# Patient Record
Sex: FEMALE | Race: BLACK OR AFRICAN AMERICAN | NOT HISPANIC OR LATINO | Employment: FULL TIME | URBAN - METROPOLITAN AREA
[De-identification: names, ages, dates, MRNs, and addresses within clinical notes are randomized per-mention and may not be internally consistent; named-entity substitution may affect disease eponyms.]

---

## 2017-02-06 ENCOUNTER — ALLSCRIPTS OFFICE VISIT (OUTPATIENT)
Dept: OTHER | Facility: OTHER | Age: 52
End: 2017-02-06

## 2017-02-17 DIAGNOSIS — I10 ESSENTIAL (PRIMARY) HYPERTENSION: ICD-10-CM

## 2017-03-03 ENCOUNTER — OFFICE VISIT (OUTPATIENT)
Dept: URGENT CARE | Facility: MEDICAL CENTER | Age: 52
End: 2017-03-03
Payer: COMMERCIAL

## 2017-03-03 PROCEDURE — S9088 SERVICES PROVIDED IN URGENT: HCPCS

## 2017-03-03 PROCEDURE — 99214 OFFICE O/P EST MOD 30 MIN: CPT

## 2017-03-29 ENCOUNTER — TRANSCRIBE ORDERS (OUTPATIENT)
Dept: ADMINISTRATIVE | Facility: HOSPITAL | Age: 52
End: 2017-03-29

## 2017-03-29 DIAGNOSIS — Z12.31 OTHER SCREENING MAMMOGRAM: Primary | ICD-10-CM

## 2017-05-24 ENCOUNTER — OFFICE VISIT (OUTPATIENT)
Dept: URGENT CARE | Facility: MEDICAL CENTER | Age: 52
End: 2017-05-24
Payer: COMMERCIAL

## 2017-05-24 PROCEDURE — S9088 SERVICES PROVIDED IN URGENT: HCPCS

## 2017-05-24 PROCEDURE — 99214 OFFICE O/P EST MOD 30 MIN: CPT

## 2017-06-12 ENCOUNTER — HOSPITAL ENCOUNTER (OUTPATIENT)
Dept: MAMMOGRAPHY | Facility: MEDICAL CENTER | Age: 52
Discharge: HOME | End: 2017-06-12
Payer: COMMERCIAL

## 2017-06-12 DIAGNOSIS — Z12.31 OTHER SCREENING MAMMOGRAM: ICD-10-CM

## 2017-06-12 PROCEDURE — G0202 SCR MAMMO BI INCL CAD: HCPCS

## 2017-09-25 ENCOUNTER — APPOINTMENT (OUTPATIENT)
Dept: LAB | Facility: HOSPITAL | Age: 52
End: 2017-09-25
Payer: COMMERCIAL

## 2017-09-25 ENCOUNTER — ALLSCRIPTS OFFICE VISIT (OUTPATIENT)
Dept: OTHER | Facility: OTHER | Age: 52
End: 2017-09-25

## 2017-09-25 DIAGNOSIS — D57.3 SICKLE-CELL TRAIT (HCC): ICD-10-CM

## 2017-09-25 LAB
BASOPHILS # BLD AUTO: 0.03 THOUSANDS/ΜL (ref 0–0.1)
BASOPHILS NFR BLD AUTO: 1 % (ref 0–1)
EOSINOPHIL # BLD AUTO: 0.13 THOUSAND/ΜL (ref 0–0.61)
EOSINOPHIL NFR BLD AUTO: 2 % (ref 0–6)
ERYTHROCYTE [DISTWIDTH] IN BLOOD BY AUTOMATED COUNT: 15.5 % (ref 11.6–15.1)
HCT VFR BLD AUTO: 34 % (ref 34.8–46.1)
HGB BLD-MCNC: 10.6 G/DL (ref 11.5–15.4)
LYMPHOCYTES # BLD AUTO: 2.87 THOUSANDS/ΜL (ref 0.6–4.47)
LYMPHOCYTES NFR BLD AUTO: 46 % (ref 14–44)
MCH RBC QN AUTO: 22.5 PG (ref 26.8–34.3)
MCHC RBC AUTO-ENTMCNC: 31.2 G/DL (ref 31.4–37.4)
MCV RBC AUTO: 72 FL (ref 82–98)
MONOCYTES # BLD AUTO: 0.37 THOUSAND/ΜL (ref 0.17–1.22)
MONOCYTES NFR BLD AUTO: 6 % (ref 4–12)
NEUTROPHILS # BLD AUTO: 2.8 THOUSANDS/ΜL (ref 1.85–7.62)
NEUTS SEG NFR BLD AUTO: 45 % (ref 43–75)
NRBC BLD AUTO-RTO: 0 /100 WBCS
PLATELET # BLD AUTO: 287 THOUSANDS/UL (ref 149–390)
PMV BLD AUTO: 12.7 FL (ref 8.9–12.7)
RBC # BLD AUTO: 4.71 MILLION/UL (ref 3.81–5.12)
WBC # BLD AUTO: 6.21 THOUSAND/UL (ref 4.31–10.16)

## 2017-09-25 PROCEDURE — 85025 COMPLETE CBC W/AUTO DIFF WBC: CPT

## 2017-09-25 PROCEDURE — 36415 COLL VENOUS BLD VENIPUNCTURE: CPT

## 2017-09-26 ENCOUNTER — GENERIC CONVERSION - ENCOUNTER (OUTPATIENT)
Dept: OTHER | Facility: OTHER | Age: 52
End: 2017-09-26

## 2017-10-10 ENCOUNTER — OFFICE VISIT (OUTPATIENT)
Dept: URGENT CARE | Facility: MEDICAL CENTER | Age: 52
End: 2017-10-10
Payer: COMMERCIAL

## 2017-10-10 PROCEDURE — 99213 OFFICE O/P EST LOW 20 MIN: CPT

## 2017-10-10 PROCEDURE — S9088 SERVICES PROVIDED IN URGENT: HCPCS

## 2017-10-11 NOTE — PROGRESS NOTES
Assessment  1  Right otitis externa (380 10) (H60 91)    Plan  Right otitis externa    · Start: Neomycin-Polymyxin-HC 3 [id number] Otic Solution; INSTILL 4 DROPS IN LEFT  EAR 3-4 TIMES DAILY    Discussion/Summary  Discussion Summary:   I prescribed Cortisporin ear drops -4 drops to be applied it to right ear 4 times a day for 7 days  Strongly encouraged patient to take Tylenol or Motrin as needed for pain  Follow-up per primary care provider if right ear canal pain persists or worsens  She expressed understanding  Chief Complaint  1  Ear Pain  Chief Complaint Free Text Note Form: Patient relates started with right ear pain x3 weeks  Denies fever  Denies cough  Denies congestion  History of Present Illness  HPI: Patient here today with complaint of right ear pain for the last 3 weeks  Describes tenderness upon touching her right canal  Denies any blockage  Denies any purulent discharge or bleeding from the right ear  She is taking no pain medication  Denies previous history of right ear pain  Hospital Based Practices Required Assessment:   Abuse And Domestic Violence Screen    Yes, the patient is safe at home -The patient states no one is hurting them  Depression And Suicide Screen  No, the patient has not had thoughts of hurting themself  No, the patient has not felt depressed in the past 7 days  Prefered Language is  english  Primary Language is  english  Review of Systems  Focused-Female:   Constitutional: No fever, no chills, feels well, no tiredness, no recent weight gain or loss  ENT: earache  Cardiovascular: no complaints of slow or fast heart rate, no chest pain, no palpitations, no leg claudication or lower extremity edema  Respiratory: no complaints of shortness of breath, no wheezing, no dyspnea on exertion, no orthopnea or PND  Active Problems  1  Benign essential hypertension (401 1) (I10)  2  Dizziness (780 4) (R42)  3  Knee clicking (415 67) (D15 601)  4   PTSD (post-traumatic stress disorder) (309 81) (F43 10)  5  Right ear pain (388 70) (H92 01)  6  Sickle cell trait (282 5) (D57 3)  7  Vitamin D insufficiency (268 9) (E55 9)    Past Medical History  1  History of Breast cyst, left (610 0) (N60 02)  2  History of Eustachian tube dysfunction, right (381 81) (H69 81)  3  History of Ganglion of hand, left (727 41) (M67 442)  4  History of Ganglion of hand, unspecified laterality (727 43) (M67 449)  5  History of  6, not currently pregnant (V49 89) (Z78 9)  6  History of Left shoulder pain (719 41) (M25 512)  7  History of Premature menopause (256 31) (E28 319)  8  History of Strain of right trapezius muscle (840 8) (F75 307G)  Active Problems And Past Medical History Reviewed: The active problems and past medical history were reviewed and updated today  Family History  Mother   1  Family history of malignant neoplasm of urinary bladder (V16 52) (Z80 52)  2  Family history of Hypertension, benign  Father   3  Family history of Emphysema/COPD  4  Family history of Hypertension, benign  Sister   5  Family history of Hypertension, benign  Brother   6  Family history of multiple sclerosis (V17 2) (Z82 0)  Maternal Grandmother   7  Family history of Coronary heart disease  Paternal Aunt   6  Family history of Hypertension, benign    Social History   · Lives with daughter   · Lives with mother   · Never a smoker    Surgical History  1  History of Breast Surgery Lumpectomy  2  History of  Section  3  History of Hernia Repair Inguinal Unilateral    Current Meds  1  [employer] Vitamin D CAPS; Therapy: (Recorded:2017) to Recorded  2  Cyclobenzaprine HCl - 5 MG Oral Tablet; TAKE 1 TABLET AT BEDTIME AS NEEDED  May   take additional tablet at bedtime to equal 10 mg;   Therapy: 08SZX3807 to (Evaluate:2017)  Requested for: 74FMC5141; Last   Rx:2017 Ordered  3   Fluticasone Propionate 50 MCG/ACT Nasal Suspension; USE 1 TO 2 SPRAYS IN EACH   NOSTRIL ONCE DAILY; Therapy: 89FSB1380 to (Last MARICHUY:89GRT5364)  Requested for: 97TAH6592 Ordered  4  HydroCHLOROthiazide 25 MG Oral Tablet; TAKE 1 TABLET DAILY  Requested for:   09SLK1833; Last Rx:02Jun2017 Ordered  5  Meclizine HCl - 25 MG Oral Tablet; TAKE 1 TABLET 3 TIMES DAILY AS NEEDED; Therapy: 04EAF1520 to (Evaluate:58Fby0420)  Requested for: 48NML4999; Last   Rx:09Lnu2514 Ordered  6  Potassium Chloride ER 20 MEQ Oral Tablet Extended Release; Therapy: (Recorded:03Mar2017) to Recorded  Medication List Reviewed: The medication list was reviewed and updated today  Allergies  1  fentanyl  2  Bee sting    Vitals  Signs   Recorded: 46NPX6280 11:14AM   Temperature: 97 6 F, Tympanic  Heart Rate: 77  Respiration: 18  Systolic: 193, RUE, Sitting  Diastolic: 98, RUE, Sitting  Height: 5 ft 6 in  Weight: 183 lb 8 oz  BMI Calculated: 29 62  BSA Calculated: 1 93  O2 Saturation: 98, RA  Pain Scale: 0    Physical Exam    Constitutional   General appearance: No acute distress, well appearing and well nourished  Ears, Nose, Mouth, and Throat   External inspection of ears and nose: Normal     Otoscopic examination: Abnormal  -Right ear canal reveals a small papule measuring about 1-2 mm in size tender to touch  Consistent with a small pustule most likely triggering symptoms of otitis externa  Tympanic membrane is intact  Nasal mucosa, septum, and turbinates: Normal without edema or erythema  Oropharynx: Normal with no erythema, edema, exudate or lesions  Pulmonary   Respiratory effort: No increased work of breathing or signs of respiratory distress  Auscultation of lungs: Clear to auscultation  Cardiovascular   Palpation of heart: Normal PMI, no thrills  Future Appointments    Date/Time Provider Specialty Site   03/14/2018 03:45 PM CEDRICK Winter   [facility]     Signatures   Electronically signed by : CEDRICK Cyr ; Oct 10 2017 11:37AM EST (Author)    Electronically signed by : CEDRICK Taylro ; Oct 10 2017 12:19PM EST                       (Author)

## 2017-10-17 ENCOUNTER — GENERIC CONVERSION - ENCOUNTER (OUTPATIENT)
Dept: OTHER | Facility: OTHER | Age: 52
End: 2017-10-17

## 2018-01-13 NOTE — PROGRESS NOTES
Assessment    1  History of Acute serous otitis media (381 01) (H65 00)   2  History of Eustachian tube dysfunction, right (381 81) (H69 81)   3  History of Muscle strain of chest wall (848 8) (S29 011A)   4  History of Strain of right trapezius muscle (840 8) (S46 811A)   5  Benign essential hypertension (401 1) (I10)   6  Encounter for preventive health examination (V70 0) (Z00 00)   7  Sickle cell trait (282 5) (D57 3)   8  Knee clicking (608 65) (R66 433)    Plan  Benign essential hypertension    · Begin or continue regular aerobic exercise  Gradually work up to at least 3 sessions of 30  minutes of exercise a week ; Status:Complete;   Done: 46UAC7699 04:43PM  Health Maintenance    · Follow-up visit in 1 year Evaluation and Treatment  Follow-up  Status: Hold For -  Scheduling  Requested for: 64HRW1662   · Drink plenty of fluids ; Status:Complete;   Done: 32OZS3368 04:22PM   · We recommend routine visits to a dentist ; Status:Complete;   Done: 66BFG5077  04:22PM   · We recommend that you follow the "Mediterranean diet "; Status:Complete;   Done:  82SSZ4028 04:22PM    NSAIDs, ROM/stretches prn, compresses  Reviewed general knee care  Watch Na+, regular exercise, medication as Rx'd  Check CBC     History of Present Illness  HM, Adult Female: The patient is being seen for a health maintenance evaluation  The last health maintenance visit was 6 month(s) ago  General Health: The patient's health since the last visit is described as good  She has regular dental visits  She complains of vision problems  Vision care includes no recent eye examination  The patient complains of difficulty reading  Lifestyle:  She does not have any weight concerns  She does not use tobacco    Screening: Breast cancer screening includes a mammogram performed 6/17  She hasn't been previously screened for colorectal cancer  HPI: 48 y/o BF for f/u appt  , health maintenance  NO acute c/o   She does reports bilateral knee clicking but other wise doing well  Review of Systems    Constitutional: No fever, no chills, feels well, no tiredness, no recent weight gain or weight loss  Eyes: No complaints of eye pain, no red eyes, no eyesight problems, no discharge, no dry eyes, no itching of eyes  ENT: no complaints of earache, no loss of hearing, no nose bleeds, no nasal discharge, no sore throat, no hoarseness  Cardiovascular: No complaints of slow heart rate, no fast heart rate, no chest pain, no palpitations, no leg claudication, no lower extremity edema  Respiratory: No complaints of shortness of breath, no wheezing, no cough, no SOB on exertion, no orthopnea, no PND  Gastrointestinal: No complaints of abdominal pain, no constipation, no nausea or vomiting, no diarrhea, no bloody stools  Genitourinary: No complaints of dysuria, no incontinence, no pelvic pain, no dysmenorrhea, no vaginal discharge or bleeding  Musculoskeletal: myalgias, but as noted in HPI, no arthralgias, no joint swelling and no joint stiffness  Integumentary: No complaints of skin rash or lesions, no itching, no skin wounds, no breast pain or lump  Neurological: No complaints of headache, no confusion, no convulsions, no numbness, no dizziness or fainting, no tingling, no limb weakness, no difficulty walking  Psychiatric: Not suicidal, no sleep disturbance, no anxiety or depression, no change in personality, no emotional problems  Endocrine: No complaints of proptosis, no hot flashes, no muscle weakness, no deepening of the voice, no feelings of weakness  Hematologic/Lymphatic: No complaints of swollen glands, no swollen glands in the neck, does not bleed easily, does not bruise easily  ROS reviewed  Active Problems    1  Benign essential hypertension (401 1) (I10)   2  Dizziness (780 4) (R42)   3  PTSD (post-traumatic stress disorder) (309 81) (F43 10)   4  Sickle cell trait (282 5) (D57 3)   5   Vitamin D insufficiency (268 9) (E55 9)    Past Medical History    · History of Breast cyst, left (610 0) (N60 02)   · History of Eustachian tube dysfunction, right (381 81) (H69 81)   · History of Ganglion of hand, left (727 41) (M67 442)   · History of Ganglion of hand, unspecified laterality (727 43) (M67 449)   · History of  6, not currently pregnant (V49 89) (Z78 9)   · History of Left shoulder pain (719 41) (M25 512)   · History of Premature menopause (256 31) (E28 319)   · History of Strain of right trapezius muscle (840 8) (A94 205D)    Surgical History    · History of Breast Surgery Lumpectomy   · History of  Section   · History of Hernia Repair Inguinal Unilateral    Family History  Mother    · Family history of malignant neoplasm of urinary bladder (V16 52) (Z80 52)   · Family history of Hypertension, benign  Father    · Family history of Emphysema/COPD   · Family history of Hypertension, benign  Sister    · Family history of Hypertension, benign  Brother    · Family history of multiple sclerosis (V17 2) (Z82 0)  Maternal Grandmother    · Family history of Coronary heart disease  Paternal Aunt    · Family history of Hypertension, benign    Social History    · Lives with daughter   · Autism jayy   · Lives with mother   · Never a smoker    Current Meds   1  [employer]S Vitamin D CAPS; Therapy: (Recorded:2017) to Recorded   2  Cyclobenzaprine HCl - 5 MG Oral Tablet; TAKE 1 TABLET AT BEDTIME AS NEEDED  May take additional tablet at bedtime to equal 10 mg;   Therapy: 58VTA7227 to (Evaluate:2017)  Requested for: 12BBF0987; Last   Rx:2017 Ordered   3  Fluticasone Propionate 50 MCG/ACT Nasal Suspension; USE 1 TO 2 SPRAYS IN EACH   NOSTRIL ONCE DAILY; Therapy: 87RTG7079 to (Last RK:29XAS6147)  Requested for: 11DDZ4655 Ordered   4  HydroCHLOROthiazide 25 MG Oral Tablet; TAKE 1 TABLET DAILY  Requested for:   65ZLC7553; Last Rx:2017 Ordered   5   Meclizine HCl - 25 MG Oral Tablet; TAKE 1 TABLET 3 TIMES DAILY AS NEEDED; Therapy: 86UMQ8631 to (Evaluate:31May2017)  Requested for: 67JGU2989; Last   Rx:24May2017 Ordered   6  Potassium Chloride ER 20 MEQ Oral Tablet Extended Release; Therapy: (Recorded:03Mar2017) to Recorded    Allergies    1  fentanyl    2  Bee sting    Vitals   Recorded: 83UZK4341 80:93DH   Systolic 627, RUE, Sitting   Diastolic 82, RUE, Sitting   BP CUFF SIZE Large   Height 5 ft 6 in   Weight 186 lb    BMI Calculated 30 02   BSA Calculated 1 94     Physical Exam    Constitutional   General appearance: No acute distress, well appearing and well nourished  Eyes   Conjunctiva and lids: No swelling, erythema or discharge  Pupils and irises: Equal, round and reactive to light  Ears, Nose, Mouth, and Throat   External inspection of ears and nose: Normal     Oropharynx: Normal with no erythema, edema, exudate or lesions  Pulmonary   Respiratory effort: No increased work of breathing or signs of respiratory distress  Auscultation of lungs: Clear to auscultation  Cardiovascular   Auscultation of heart: Normal rate and rhythm, normal S1 and S2, without murmurs  Examination of extremities for edema and/or varicosities: Normal     Abdomen   Abdomen: Non-tender, no masses  The abdomen was flat  Lymphatic   Palpation of lymph nodes in neck: No lymphadenopathy  Musculoskeletal   Gait and station: Normal     Digits and nails: Normal without clubbing or cyanosis  Inspection/palpation of joints, bones, and muscles: Normal     Skin   Skin and subcutaneous tissue: Normal without rashes or lesions  Neurologic   Cranial nerves: Cranial nerves 2-12 intact  Reflexes: 2+ and symmetric  Sensation: No sensory loss      Psychiatric   Orientation to person, place, and time: Normal     Mood and affect: Normal        Results/Data  PHQ-2 Adult Depression Screening 46Tey3192 04:05PM User, s     Test Name Result Flag Reference   PHQ-2 Adult Depression Score 0     Over the last two weeks, how often have you been bothered by any of the following problems? Little interest or pleasure in doing things: Not at all - 0  Feeling down, depressed, or hopeless: Not at all - 0   PHQ-2 Adult Depression Screening Negative         Future Appointments    Date/Time Provider Specialty Site   03/14/2018 03:45 PM CEDRICK Lee   [facility]     Signatures   Electronically signed by : CEDRICK Florence ; Sep 25 2017  4:45PM EST                       (Author)

## 2018-01-14 VITALS
WEIGHT: 192 LBS | HEIGHT: 66 IN | DIASTOLIC BLOOD PRESSURE: 92 MMHG | SYSTOLIC BLOOD PRESSURE: 140 MMHG | BODY MASS INDEX: 30.86 KG/M2

## 2018-01-14 VITALS
WEIGHT: 186 LBS | BODY MASS INDEX: 29.89 KG/M2 | DIASTOLIC BLOOD PRESSURE: 82 MMHG | SYSTOLIC BLOOD PRESSURE: 132 MMHG | HEIGHT: 66 IN

## 2018-01-16 NOTE — RESULT NOTES
Verified Results  (1) CBC/PLT/DIFF 89WFK6764 08:45PM Selene Garland Order Number: [id number]     Test Name Result Flag Reference   WBC COUNT 6 21 Thousand/uL  4 31-10 16   RBC COUNT 4 71 Million/uL  3 81-5 12   HEMOGLOBIN 10 6 g/dL L 11 5-15 4   HEMATOCRIT 34 0 % L 34 8-46  1   MCV 72 fL L 82-98   MCH 22 5 pg L 26 8-34 3   MCHC 31 2 g/dL L 31 4-37 4   RDW 15 5 % H 11 6-15 1   MPV 12 7 fL  8 9-12 7   PLATELET COUNT 415 Thousands/uL  149-390   nRBC AUTOMATED 0 /100 WBCs     NEUTROPHILS RELATIVE PERCENT 45 %  43-75   LYMPHOCYTES RELATIVE PERCENT 46 % H 14-44   MONOCYTES RELATIVE PERCENT 6 %  4-12   EOSINOPHILS RELATIVE PERCENT 2 %  0-6   BASOPHILS RELATIVE PERCENT 1 %  0-1   NEUTROPHILS ABSOLUTE COUNT 2 80 Thousands/? ??L  1 85-7 62   LYMPHOCYTES ABSOLUTE COUNT 2 87 Thousands/? ??L  0 60-4 47   MONOCYTES ABSOLUTE COUNT 0 37 Thousand/? ??L  0 17-1 22   EOSINOPHILS ABSOLUTE COUNT 0 13 Thousand/? ??L  0 00-0 61   BASOPHILS ABSOLUTE COUNT 0 03 Thousands/? ??L  0 00-0 10

## 2018-01-22 VITALS — DIASTOLIC BLOOD PRESSURE: 86 MMHG | SYSTOLIC BLOOD PRESSURE: 128 MMHG

## 2018-01-25 DIAGNOSIS — I10 ESSENTIAL HYPERTENSION: Primary | ICD-10-CM

## 2018-01-25 RX ORDER — HYDROCHLOROTHIAZIDE 25 MG/1
1 TABLET ORAL DAILY
COMMUNITY
End: 2018-01-25 | Stop reason: SDUPTHER

## 2018-01-26 RX ORDER — HYDROCHLOROTHIAZIDE 25 MG/1
25 TABLET ORAL DAILY
Qty: 30 TABLET | Refills: 1 | Status: SHIPPED | OUTPATIENT
Start: 2018-01-26 | End: 2018-03-26 | Stop reason: SDUPTHER

## 2018-03-13 PROBLEM — D57.3 SICKLE CELL TRAIT (HCC): Status: ACTIVE | Noted: 2017-02-06

## 2018-03-13 PROBLEM — I10 BENIGN ESSENTIAL HYPERTENSION: Status: ACTIVE | Noted: 2017-02-06

## 2018-03-13 PROBLEM — F43.10 PTSD (POST-TRAUMATIC STRESS DISORDER): Status: ACTIVE | Noted: 2017-02-06

## 2018-03-13 PROBLEM — E55.9 VITAMIN D INSUFFICIENCY: Status: ACTIVE | Noted: 2017-02-06

## 2018-03-26 DIAGNOSIS — I10 ESSENTIAL HYPERTENSION: ICD-10-CM

## 2018-03-26 RX ORDER — HYDROCHLOROTHIAZIDE 25 MG/1
25 TABLET ORAL DAILY
Qty: 30 TABLET | Refills: 2 | Status: SHIPPED | OUTPATIENT
Start: 2018-03-26

## 2018-03-26 NOTE — TELEPHONE ENCOUNTER
I spoke with Woody Pierce at [facility], requesting refill of HCTZ for patient  Submitted to [facility]   For approval  Zoya Baldwin

## 2022-06-24 ENCOUNTER — APPOINTMENT (OUTPATIENT)
Dept: RADIOLOGY | Facility: MEDICAL CENTER | Age: 57
End: 2022-06-24

## 2022-06-24 ENCOUNTER — OCCMED (OUTPATIENT)
Dept: URGENT CARE | Facility: MEDICAL CENTER | Age: 57
End: 2022-06-24

## 2022-06-24 DIAGNOSIS — Z13.9 SCREENING FOR CONDITION: ICD-10-CM

## 2022-06-24 DIAGNOSIS — Z13.9 SCREENING FOR CONDITION: Primary | ICD-10-CM

## 2022-06-24 PROCEDURE — 71045 X-RAY EXAM CHEST 1 VIEW: CPT

## 2023-05-30 ENCOUNTER — APPOINTMENT (OUTPATIENT)
Dept: URGENT CARE | Facility: CLINIC | Age: 58
End: 2023-05-30

## 2023-06-06 ENCOUNTER — OCCMED (OUTPATIENT)
Dept: URGENT CARE | Facility: CLINIC | Age: 58
End: 2023-06-06
Payer: OTHER MISCELLANEOUS

## 2023-06-06 DIAGNOSIS — S39.012D BACK STRAIN, SUBSEQUENT ENCOUNTER: Primary | ICD-10-CM

## 2023-06-06 PROCEDURE — 99213 OFFICE O/P EST LOW 20 MIN: CPT | Performed by: PHYSICIAN ASSISTANT

## 2023-06-08 ENCOUNTER — EVALUATION (OUTPATIENT)
Dept: PHYSICAL THERAPY | Facility: CLINIC | Age: 58
End: 2023-06-08
Payer: OTHER MISCELLANEOUS

## 2023-06-08 DIAGNOSIS — S39.012A STRAIN OF MUSCLE, FASCIA AND TENDON OF LOWER BACK, INITIAL ENCOUNTER: Primary | ICD-10-CM

## 2023-06-08 PROCEDURE — 97161 PT EVAL LOW COMPLEX 20 MIN: CPT | Performed by: PHYSICAL THERAPIST

## 2023-06-08 PROCEDURE — 97140 MANUAL THERAPY 1/> REGIONS: CPT | Performed by: PHYSICAL THERAPIST

## 2023-06-08 NOTE — PROGRESS NOTES
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  Patient would benefit from skilled PT to address listed impairments and increase functional status  Impairments: abnormal muscle firing, abnormal or restricted ROM, activity intolerance, impaired physical strength, lacks appropriate home exercise program, pain with function and poor body mechanics    Symptom irritability: low  Goals  Short term goals (2-3 weeks):  1  Patient will demonstrate understanding and compliance of HEP  2  Patient will strengthen hip abductors to 3+/5   3  Patient will report working a full shift with minimal pain  Long term goals (4-6 weeks):  1  Patient will demonstrate proper lifting mechanics to perform occupational tasks  2  Patient will increase hip abductor strength to 4-/5    3  Patient will meet or exceed expected FOTO outcome       Plan  Patient would benefit from: skilled physical therapy  Planned modality interventions: cryotherapy, low level laser therapy, TENS, thermotherapy: hydrocollator packs, traction and unattended electrical stimulation  Planned therapy interventions: abdominal trunk stabilization, activity modification, body mechanics training, functional ROM exercises, flexibility, home exercise program, therapeutic exercise, therapeutic activities, stretching, strengthening, neuromuscular re-education, joint "mobilization, manual therapy, balance, patient education and transfer training  Frequency: 2x week  Duration in weeks: 8  Treatment plan discussed with: patient        Subjective Evaluation    History of Present Illness  Mechanism of injury: Patient presents to outpatient physical therapy with chief complaint of low back pain  Patient reports sudden pain that began end of May when she was helping lift a patient at work  Similar injury has happened prior, which was resolved with muscle relaxants  Patient Denies episodes of numbness or tingling in LE  Pain described as aching in low back  Pain rating currently 3/10, at best 2/10 and at worst 9/10  Patient states limitations with working, bending forwards, sleeping  Pt is a caregiver at [employer]  She is currently on light duty at work, per doctor's orders with a 10 pound lifting restriction  Aggravating factors include bending forward  Patient states use of steroid medication, muscle relaxants, tyenol, and heat for relief of symptoms  Pt goals for therapy include decrease in pain and to \"get well\"  Recurrent probem    Quality of life: good    Pain  Current pain rating: 3  At best pain ratin  At worst pain ratin  Quality: dull ache  Relieving factors: heat, medications and rest  Aggravating factors: lifting  Progression: improved    Social Support    Employment status: working  Hand dominance: right      Diagnostic Tests    FCE comments: XRAY lumbar 23: IMPRESSION:   No lumbar spine fracture or subluxation     Treatments  Previous treatment: medication  Current treatment: medication  Patient Goals  Patient goals for therapy: decreased pain, increased strength, return to work and independence with ADLs/IADLs          Objective     Concurrent Complaints  Negative for bladder dysfunction, bowel dysfunction and saddle (S4) numbness    Palpation   Left   Tenderness of the gluteus florina, gluteus medius and " piriformis  Right   Hypertonic in the gluteus florina and gluteus medius  Tenderness of the gluteus florina, gluteus medius and piriformis  Neurological Testing     Sensation     Lumbar   Left   Intact: light touch    Right   Intact: light touch    Reflexes   Left   Patellar (L4): normal (2+)  Achilles (S1): normal (2+)    Right   Patellar (L4): normal (2+)  Achilles (S1): normal (2+)    Active Range of Motion     Lumbar   Flexion: Active lumbar flexion: Painful  Restriction level: minimal  Extension:  Restriction level: minimal  Left lateral flexion: Active left lumbar lateral flexion: Painful  Restriction level: minimal  Right lateral flexion:  Restriction level: minimal    Additional Active Range of Motion Details  Left lumbar flexion was painful and pt reported feeling a stretch from the right low back  Joint Play   Joints within functional limits: L2, L3, L4, L5 and S1   Mechanical Assessment    Cervical      Thoracic      Lumbar    Standing extension: repeated movements  Pain intensity: better  Pain level: decreased    Strength/Myotome Testing     Left Hip   Planes of Motion   Flexion: 4  Abduction: 3    Right Hip   Planes of Motion   Flexion: 4  Abduction: 3    Left Knee   Flexion: 4+  Extension: 4+    Right Knee   Flexion: 4+  Extension: 4+    Left Ankle/Foot   Dorsiflexion: 4+  Plantar flexion: 4+    Right Ankle/Foot   Dorsiflexion: 4+  Plantar flexion: 4+    Tests     Lumbar     Left   Positive femoral stretch, passive SLR and slump test      Right   Positive femoral stretch, passive SLR and slump test      Left Hip   Positive Ely's  Negative GIANCARLO and FADIR  Right Hip   Positive Ely's  Negative GIANCARLO and FADIR  Additional Tests Details  Piriformis and quadriceps tightness noted bilaterally         Flowsheet Rows    Flowsheet Row Most Recent Value   PT/OT G-Codes    Current Score 52   Projected Score 66             Precautions: HTN      Manuals 6/8            Right glute med STM AG                                                   Neuro Re-Ed             TA activation              Pball press              Palloff press             TA SLR                                                     Ther Ex             Clamshells HEP            Piriformis stretch HEP            Standing lumbar extension  HEP            Seated Sciatic nerve glides  HEP            Standing hip abduction              Seated HS stretch             Prone quad stretch                          Ther Activity             Step ups              Mini squats              Lifting mechanics              Gait Training                                       Modalities

## 2023-06-14 ENCOUNTER — OFFICE VISIT (OUTPATIENT)
Dept: PHYSICAL THERAPY | Facility: CLINIC | Age: 58
End: 2023-06-14
Payer: OTHER MISCELLANEOUS

## 2023-06-14 DIAGNOSIS — S39.012A STRAIN OF MUSCLE, FASCIA AND TENDON OF LOWER BACK, INITIAL ENCOUNTER: Primary | ICD-10-CM

## 2023-06-14 PROCEDURE — 97140 MANUAL THERAPY 1/> REGIONS: CPT | Performed by: PHYSICAL THERAPIST

## 2023-06-14 PROCEDURE — 97110 THERAPEUTIC EXERCISES: CPT | Performed by: PHYSICAL THERAPIST

## 2023-06-14 NOTE — PROGRESS NOTES
"Daily Note     Today's date: 2023  Patient name: Arash Lizarraga  : 1965  MRN: [id number]  Referring provider: Devika Garza PA-C  Dx:   Encounter Diagnosis     ICD-10-CM    1  Strain of muscle, fascia and tendon of lower back, initial encounter  S39 012A                      Subjective: Pt reports feeling better from initial evaluation  Pt reports going to [place] over the weekend and sitting in the car for the long drive aggravated her back  Pt reports being constipated for 3 days then once she went to the bathroom her back was aggravated again  Today, she feels better  States she did not perform her HEP  Objective: See treatment diary below      Assessment: Pt experienced L hamstring muscle cramp after the recumbent bike; relieved with seated hamstring stretch  Slump test was re-assessed; sciatic nerve tension - bilaterally  Pt experienced hamstring cramps while performing clamshells resulting in decreased reps and discontinue of intervention during session  Pt reported a sharp pain after Pball roll outs; relieved with repeated standing lumbar extension  Pt education was provided on the importance of adhering to HEP and HEP was reviewed  Plan: Continue per plan of care  Precautions: HTN      Manuals            Right glute med STM AG RM the stick                                                   Neuro Re-Ed             TA activation   NV           Pball press              Palloff press             TA SLR                                                     Ther Ex             Bike  5 min Treadmill           Clamshells HEP x5           Piriformis stretch HEP 3x30\" ea           Standing lumbar extension  HEP x20           Seated Sciatic nerve glides  HEP            Standing hip abduction   2x10 ea  Seated HS stretch  3x30\" ea              Prone quad stretch             Pball roll outs  10x10\" 3 way           Ther Activity             Step ups              Mini squats            " Lifting mechanics              Gait Training                                       Modalities

## 2023-06-15 ENCOUNTER — OFFICE VISIT (OUTPATIENT)
Dept: PHYSICAL THERAPY | Facility: CLINIC | Age: 58
End: 2023-06-15
Payer: OTHER MISCELLANEOUS

## 2023-06-15 DIAGNOSIS — S39.012A STRAIN OF MUSCLE, FASCIA AND TENDON OF LOWER BACK, INITIAL ENCOUNTER: Primary | ICD-10-CM

## 2023-06-15 PROCEDURE — 97140 MANUAL THERAPY 1/> REGIONS: CPT | Performed by: PHYSICAL THERAPIST

## 2023-06-15 PROCEDURE — 97110 THERAPEUTIC EXERCISES: CPT | Performed by: PHYSICAL THERAPIST

## 2023-06-19 ENCOUNTER — OFFICE VISIT (OUTPATIENT)
Dept: PHYSICAL THERAPY | Facility: CLINIC | Age: 58
End: 2023-06-19
Payer: OTHER MISCELLANEOUS

## 2023-06-19 DIAGNOSIS — S39.012A STRAIN OF MUSCLE, FASCIA AND TENDON OF LOWER BACK, INITIAL ENCOUNTER: Primary | ICD-10-CM

## 2023-06-19 PROCEDURE — 97112 NEUROMUSCULAR REEDUCATION: CPT | Performed by: PHYSICAL THERAPIST

## 2023-06-19 PROCEDURE — 97140 MANUAL THERAPY 1/> REGIONS: CPT | Performed by: PHYSICAL THERAPIST

## 2023-06-19 PROCEDURE — 97110 THERAPEUTIC EXERCISES: CPT | Performed by: PHYSICAL THERAPIST

## 2023-06-19 NOTE — PROGRESS NOTES
"Daily Note     Today's date: 2023  Patient name: Dorothy Harrison  : 1965  MRN: [id number]  Referring provider: Ford Ravi PA-C  Dx:   Encounter Diagnosis     ICD-10-CM    1  Strain of muscle, fascia and tendon of lower back, initial encounter  S39 012A                      Subjective: Pt reports feeling very sore today because she stood a lot at work today  She is still on light duty at work  Although, she reports feeling good after last session  Objective: See treatment diary below      Assessment: Pt was verbally cued to maintain knee extension while performing SLR; pt demonstrated appropriate fatigue as reps progressed  Pt was verbally cued on how to perform TA activation and demonstrated good carryover  Pt was cued to decrease hip flexor compensation when performing standing hip abduction  Pt was cued to decrease trunk forward flexion when performing pball and palloff press to decrease compensation of TA weakness  Program was minimally progressed due to increased soreness caused from standing at work  Plan: Continue per plan of care  Precautions: HTN      Manuals 6/8 6/14 6/15 6/19         Right glute med STM AG RM the stick  RM the stick  RM the stick R erector spinae                                                Neuro Re-Ed             TA activation   NV 10x10\" 10x10\"         Pball press     2x10 3\" hold         Palloff press   OTB 3\"x10 OTB 3\"x10         TA SLR    x10 bilat 2x10 bilat                                                Ther Ex             Bike  5 min Treadmill 5min 1  4mph Treadmilll 0 5 mph 5 min          Clamshells HEP x5 2x10 bilat 2x10 bilat         Piriformis stretch HEP 3x30\" ea 3x30\" ea 3x30\" ea         Standing lumbar extension  HEP x20           Seated Sciatic nerve glides  HEP            Standing hip abduction   2x10 ea  2x10 ea  2x10 ea  Seated HS stretch  3x30\" ea  3x30\" ea  3x30\" ea            Prone quad stretch             Pball roll outs  " "10x10\" 3 way 10x10\" 3 way 10x10\" 3 way         Ther Activity             Step ups              Mini squats    x10 2x10         Lifting mechanics              Gait Training                                       Modalities             MH Lumbar    Supine 10 mins                           "

## 2023-06-20 ENCOUNTER — OCCMED (OUTPATIENT)
Dept: URGENT CARE | Facility: CLINIC | Age: 58
End: 2023-06-20
Payer: OTHER MISCELLANEOUS

## 2023-06-20 DIAGNOSIS — S39.012D BACK STRAIN, SUBSEQUENT ENCOUNTER: Primary | ICD-10-CM

## 2023-06-20 PROCEDURE — 99213 OFFICE O/P EST LOW 20 MIN: CPT | Performed by: FAMILY MEDICINE

## 2023-06-21 ENCOUNTER — OFFICE VISIT (OUTPATIENT)
Dept: PHYSICAL THERAPY | Facility: CLINIC | Age: 58
End: 2023-06-21
Payer: OTHER MISCELLANEOUS

## 2023-06-21 DIAGNOSIS — S39.012A STRAIN OF MUSCLE, FASCIA AND TENDON OF LOWER BACK, INITIAL ENCOUNTER: Primary | ICD-10-CM

## 2023-06-21 PROCEDURE — 97140 MANUAL THERAPY 1/> REGIONS: CPT

## 2023-06-21 PROCEDURE — 97110 THERAPEUTIC EXERCISES: CPT

## 2023-06-21 NOTE — PROGRESS NOTES
"Daily Note     Today's date: 2023  Patient name: Kranthi Patiño  : 1965  MRN: [id number]  Referring provider: Loreta Escamilla PA-C  Dx:   Encounter Diagnosis     ICD-10-CM    1  Strain of muscle, fascia and tendon of lower back, initial encounter  S39 012A                      Subjective: Patient reported feeling very fatigued today  Still on light duty at work  [facility] visit yesterday went well and will see them again in two weeks and potentially be released full duty at that time  Objective: See treatment diary below  Assessment: Continued to provide patient with verbal cueing for improved carryover of form with stretching and tactile cues to ensure proper TA activation with some decreased inhibition related to weakness  Skilled PT remains appropriate to further improve mobility and proximal strength needed to protect lumbar spine with activity and to introduce lifting mechanics to safely return patient to full duties  Plan: Continue per plan of care  Precautions: HTN    Manuals 6/8 6/14 6/15 6/19 6/21        Right glute med STM AG RM the stick  RM the stick  RM the stick R erector spinae R erector spinae and glute med with stick - EH                                               Neuro Re-Ed             TA activation   NV 10x10\" 10x10\" 10\"x  10        Pball press     2x10 3\" hold 3\" hold,  2x10        Palloff press   OTB 3\"x10 OTB 3\"x10 NV        TA SLR    x10 bilat 2x10 bilat 2x10  bilat                                               Ther Ex             Bike  5 min Treadmill 5min 1  4mph Treadmilll 0 5 mph 5 min  Treadmill  0 5 mph  5 min        Clamshells HEP x5 2x10 bilat 2x10 bilat 2x10  bilat        Piriformis stretch HEP 3x30\" ea 3x30\" ea 3x30\" ea 30\"x3  bilat        Standing lumbar extension  HEP x20           Seated Sciatic nerve glides  HEP            Standing hip abduction   2x10 ea  2x10 ea  2x10 ea  2x10  bilat        Seated HS stretch  3x30\" ea  3x30\" ea  3x30\" ea    " "30\"x3  bilat        Prone quad stretch             Pball roll outs  10x10\" 3 way 10x10\" 3 way 10x10\" 3 way 3-way  10\"x  10 ea        Ther Activity             Step ups              Mini squats    x10 2x10 2x10        Lifting mechanics              Gait Training                                       Modalities             MH Lumbar    Supine 10 mins def                          "

## 2023-06-26 ENCOUNTER — OFFICE VISIT (OUTPATIENT)
Dept: PHYSICAL THERAPY | Facility: CLINIC | Age: 58
End: 2023-06-26
Payer: OTHER MISCELLANEOUS

## 2023-06-26 DIAGNOSIS — S39.012A STRAIN OF MUSCLE, FASCIA AND TENDON OF LOWER BACK, INITIAL ENCOUNTER: Primary | ICD-10-CM

## 2023-06-26 PROCEDURE — 97140 MANUAL THERAPY 1/> REGIONS: CPT | Performed by: PHYSICAL THERAPIST

## 2023-06-26 PROCEDURE — 97110 THERAPEUTIC EXERCISES: CPT | Performed by: PHYSICAL THERAPIST

## 2023-06-26 NOTE — PROGRESS NOTES
"Daily Note     Today's date: 2023  Patient name: Bret Baptiste  : 1965  MRN: [id number]  Referring provider: Hugo Isaacs PA-C  Dx:   Encounter Diagnosis     ICD-10-CM    1  Strain of muscle, fascia and tendon of lower back, initial encounter  S39 012A                      Subjective: Pt reports feeling much better, but still has some low back pain from standing at work all day  Objective: See treatment diary below      Assessment: Intensity of clamshells was progressed by increasing number of reps  Pt demonstrated appropriate fatigue with increased intensity with no pain or muscular cramps this session  During the eccentric phase, pt was cued to perform clamshells and SLR in a slow, controlled manner; pt demonstrated carryover  Pt was cued to decrease excessive forward flexion when performing pball presses to decrease stress on back  Overall, pt is progressing well, further supported by improved FOTO outcomes  Plan: Continue per plan of care  Precautions: HTN    Manuals 6/8 6/14 6/15 6/19 6/21 6/26       Right glute med STM AG RM the stick  RM the stick  RM the stick R erector spinae R erector spinae and glute med with stick - EH R erector spinae - RM                                              Neuro Re-Ed             TA activation   NV 10x10\" 10x10\" 10\"x  10        Pball press     2x10 3\" hold 3\" hold,  2x10 3\" hold,  2x10       Palloff press   OTB 3\"x10 OTB 3\"x10 NV OTB 3\"x10       TA SLR    x10 bilat 2x10 bilat 2x10  bilat 2x10 bilat                                               Ther Ex             Bike  5 min Treadmill 5min 1  4mph Treadmilll 0 5 mph 5 min  Treadmill  0 5 mph  5 min Treadmill 0 8 mph        Clamshells HEP x5 2x10 bilat 2x10 bilat 2x10  bilat 3x10 bilat        Piriformis stretch HEP 3x30\" ea 3x30\" ea 3x30\" ea 30\"x3  bilat 30\"x3  bilat       Standing lumbar extension  HEP x20           Seated Sciatic nerve glides  HEP            Standing hip abduction   2x10 ea    2x10 " "ea   2x10 ea  2x10  bilat 2x10  bilat       Seated HS stretch  3x30\" ea  3x30\" ea    3x30\" ea   30\"x3  bilat 30\"x3  bilat       Prone quad stretch             Pball roll outs  10x10\" 3 way 10x10\" 3 way 10x10\" 3 way 3-way  10\"x  10 ea 3-way  10\"x  10 ea       Ther Activity             Step ups              Mini squats    x10 2x10 2x10 NV       Lifting mechanics              Gait Training                                       Modalities             MH Lumbar    Supine 10 mins def                          "

## 2023-06-28 ENCOUNTER — OFFICE VISIT (OUTPATIENT)
Dept: PHYSICAL THERAPY | Facility: CLINIC | Age: 58
End: 2023-06-28
Payer: OTHER MISCELLANEOUS

## 2023-06-28 DIAGNOSIS — S39.012A STRAIN OF MUSCLE, FASCIA AND TENDON OF LOWER BACK, INITIAL ENCOUNTER: Primary | ICD-10-CM

## 2023-06-28 PROCEDURE — 97140 MANUAL THERAPY 1/> REGIONS: CPT | Performed by: PHYSICAL THERAPIST

## 2023-06-28 PROCEDURE — 97112 NEUROMUSCULAR REEDUCATION: CPT | Performed by: PHYSICAL THERAPIST

## 2023-06-28 PROCEDURE — 97110 THERAPEUTIC EXERCISES: CPT | Performed by: PHYSICAL THERAPIST

## 2023-06-28 PROCEDURE — 97530 THERAPEUTIC ACTIVITIES: CPT | Performed by: PHYSICAL THERAPIST

## 2023-06-28 NOTE — PROGRESS NOTES
"Daily Note     Today's date: 2023  Patient name: Bret Baptiste  : 1965  MRN: [id number]  Referring provider: Hugo Isaacs PA-C  Dx:   Encounter Diagnosis     ICD-10-CM    1  Strain of muscle, fascia and tendon of lower back, initial encounter  S39 012A                       Subjective: Pt reports feeling fatigued from work, but her back feels good and has not have any pain since yesterday  Objective: See treatment diary below      Assessment: R glute med tightness noted upon palpation; decreased through STM  Pt education was provided on proper lifting mechanics to help decrease strain on back; pt demonstrated good carryover, however significant knee pain present, therefore decreased reps performed  Intensity of palloff press progressed by increasing resistance of TB  Overall, pt demonstrated appropriate fatigue post session  Plan: Continue per plan of care  Precautions: HTN    Manuals 6/8 6/14 6/15 6/19 6/21 6/26 6/28      Right glute med STM AG RM the stick  RM the stick  RM the stick R erector spinae R erector spinae and glute med with stick - EH R erector spinae - RM R erector spinae - RM                                             Neuro Re-Ed             TA activation   NV 10x10\" 10x10\" 10\"x  10        Pball press     2x10 3\" hold 3\" hold,  2x10 3\" hold,  2x10 3\" hold,  2x10      Palloff press   OTB 3\"x10 OTB 3\"x10 NV OTB 3\"x10 PTB 3\"x10      TA SLR    x10 bilat 2x10 bilat 2x10  bilat 2x10 bilat                                               Ther Ex             Bike  5 min Treadmill 5min 1  4mph Treadmilll 0 5 mph 5 min  Treadmill  0 5 mph  5 min Treadmill 0 8 mph  Treadmill 0 5 mph 5 min      Clamshells HEP x5 2x10 bilat 2x10 bilat 2x10  bilat 3x10 bilat  3x10 bilat       Piriformis stretch HEP 3x30\" ea 3x30\" ea 3x30\" ea 30\"x3  bilat 30\"x3  bilat 30\"x3  bilat      Standing lumbar extension  HEP x20           Seated Sciatic nerve glides  HEP            Standing hip abduction   2x10 ea    " "2x10 ea  2x10 ea  2x10  bilat 2x10  bilat 2x10  bilat      Seated HS stretch  3x30\" ea  3x30\" ea    3x30\" ea   30\"x3  bilat 30\"x3  bilat 30\"x3  bilat      Prone quad stretch             Pball roll outs  10x10\" 3 way 10x10\" 3 way 10x10\" 3 way 3-way  10\"x  10 ea 3-way  10\"x  10 ea 3-way  10\"x  10 ea      Ther Activity             Step ups              Mini squats    x10 2x10 2x10 NV       Lifting mechanics        Sumo squats w/ 12# box x10      Gait Training                                       Modalities             MH Lumbar    Supine 10 mins def                          "

## 2023-07-03 ENCOUNTER — OFFICE VISIT (OUTPATIENT)
Dept: PHYSICAL THERAPY | Facility: CLINIC | Age: 58
End: 2023-07-03
Payer: OTHER MISCELLANEOUS

## 2023-07-03 DIAGNOSIS — S39.012A STRAIN OF MUSCLE, FASCIA AND TENDON OF LOWER BACK, INITIAL ENCOUNTER: Primary | ICD-10-CM

## 2023-07-03 PROCEDURE — 97110 THERAPEUTIC EXERCISES: CPT | Performed by: PHYSICAL THERAPIST

## 2023-07-03 PROCEDURE — 97140 MANUAL THERAPY 1/> REGIONS: CPT | Performed by: PHYSICAL THERAPIST

## 2023-07-03 PROCEDURE — 97112 NEUROMUSCULAR REEDUCATION: CPT | Performed by: PHYSICAL THERAPIST

## 2023-07-03 PROCEDURE — 97530 THERAPEUTIC ACTIVITIES: CPT | Performed by: PHYSICAL THERAPIST

## 2023-07-03 NOTE — PROGRESS NOTES
Daily Note     Today's date: 7/3/2023  Patient name: Adrián Amanda  : 1965  MRN: [id number]  Referring provider: Sowmya Jones PA-C  Dx:   Encounter Diagnosis     ICD-10-CM    1. Strain of muscle, fascia and tendon of lower back, initial encounter  S39.012A                      Subjective: Pt reports having no low back pain since last week. Pt reports that she is seeing her doctor Wednesday. Objective: See treatment diary below      Assessment: Pt was cued to maintain knee extension while performing SLR to decrease quad lag. Intensity of standing hip abduction was progressed by adding a theraband; pt was verbally cued to decrease hip flexor compensation. Pt was cued to widen stance of sumo squats; pt demonstrated appropriate carryover and resulting in improved form. Pt was cued to maintain elbow extension and TA activitation while performing shoulder extension. Overall, pt is progressing in an appropriate manner. Plan: Continue per plan of care. Precautions: HTN    Manuals 6/8 6/14 6/15 6/19 6/21 6/26 6/28 7/3     Right glute med STM AG RM the stick  RM the stick  RM the stick R erector spinae R erector spinae and glute med with stick - EH R erector spinae - RM R erectorspinae - RM R erectorspinae - RM                                            Neuro Re-Ed             TA activation   NV 10x10" 10x10" 10"x  10        Pball press     2x10 3" hold 3" hold,  2x10 3" hold,  2x10 3" hold,  2x10 3" hold, 2x10     Palloff press   OTB 3"x10 OTB 3"x10 NV OTB 3"x10 PTB 3"x10 Purple TB 3"x10     TA SLR    x10 bilat 2x10 bilat 2x10  bilat 2x10 bilat   2x10 bilat     Shoulder extension TA        2x10 OTB                               Ther Ex             Bike  5 min Treadmill 5min 1. 4mph Treadmilll 0.5 mph 5 min  Treadmill  0.5 mph  5 min Treadmill 0.8 mph  Treadmill 0.5 mph 5 min Treadmill 0.5 mph 5 min     Clamshells HEP x5 2x10 bilat 2x10 bilat 2x10  bilat 3x10 bilat  3x10 bilat  3x10 bilat      Piriformis stretch HEP 3x30" ea 3x30" ea 3x30" ea 30"x3  bilat 30"x3  bilat 30"x3  bilat 30"x3  bilat     Standing lumbar extension  HEP x20           Seated Sciatic nerve glides  HEP            Standing hip abduction   2x10 ea. 2x10 ea. 2x10 ea. 2x10  bilat 2x10  bilat 2x10  bilat OTB 2x10 bilat     Seated HS stretch  3x30" ea. 3x30" ea. 3x30" ea.   30"x3  bilat 30"x3  bilat 30"x3  bilat 30"x3  bilat     Prone quad stretch             Pball roll outs  10x10" 3 way 10x10" 3 way 10x10" 3 way 3-way  10"x  10 ea 3-way  10"x  10 ea 3-way  10"x  10 ea 3-way 10"x10 ea     Ther Activity             Step ups              Mini squats    x10 2x10 2x10 NV       Lifting mechanics        Sumo squats w/ 12# box x10 Sumo squats 12# x10     Gait Training                                       Modalities             MH Lumbar    Supine 10 mins def

## 2023-07-05 ENCOUNTER — APPOINTMENT (OUTPATIENT)
Dept: URGENT CARE | Facility: CLINIC | Age: 58
End: 2023-07-05
Payer: OTHER MISCELLANEOUS

## 2023-07-05 ENCOUNTER — APPOINTMENT (OUTPATIENT)
Dept: PHYSICAL THERAPY | Facility: CLINIC | Age: 58
End: 2023-07-05
Payer: OTHER MISCELLANEOUS

## 2023-07-05 PROCEDURE — 99213 OFFICE O/P EST LOW 20 MIN: CPT | Performed by: PHYSICIAN ASSISTANT

## 2024-09-07 ENCOUNTER — APPOINTMENT (OUTPATIENT)
Dept: URGENT CARE | Facility: MEDICAL CENTER | Age: 59
End: 2024-09-07

## 2024-09-07 ENCOUNTER — APPOINTMENT (OUTPATIENT)
Dept: RADIOLOGY | Facility: MEDICAL CENTER | Age: 59
End: 2024-09-07

## 2024-09-07 DIAGNOSIS — Z02.89 ENCOUNTER FOR PHYSICAL EXAMINATION RELATED TO EMPLOYMENT: Primary | ICD-10-CM

## 2024-09-07 DIAGNOSIS — Z02.89 ENCOUNTER FOR PHYSICAL EXAMINATION RELATED TO EMPLOYMENT: ICD-10-CM

## 2024-09-07 PROCEDURE — 71045 X-RAY EXAM CHEST 1 VIEW: CPT

## 2025-01-22 ENCOUNTER — APPOINTMENT (OUTPATIENT)
Dept: URGENT CARE | Age: 60
End: 2025-01-22
Payer: OTHER MISCELLANEOUS

## 2025-01-22 PROCEDURE — G0383 LEV 4 HOSP TYPE B ED VISIT: HCPCS

## 2025-01-22 PROCEDURE — 99284 EMERGENCY DEPT VISIT MOD MDM: CPT

## 2025-01-23 ENCOUNTER — APPOINTMENT (OUTPATIENT)
Dept: LAB | Age: 60
End: 2025-01-23
Payer: OTHER MISCELLANEOUS

## 2025-01-23 DIAGNOSIS — Z77.21 PERSONAL HISTORY OF EXPOSURE TO POTENTIALLY HAZARDOUS BODY FLUIDS: ICD-10-CM

## 2025-01-23 LAB
HBV SURFACE AG SER QL: NORMAL
HCV AB SER QL: NORMAL
HIV 1+2 AB+HIV1 P24 AG SERPL QL IA: NORMAL
HIV 2 AB SERPL QL IA: NORMAL
HIV1 AB SERPL QL IA: NORMAL
HIV1 P24 AG SERPL QL IA: NORMAL

## 2025-01-23 PROCEDURE — 87340 HEPATITIS B SURFACE AG IA: CPT

## 2025-01-23 PROCEDURE — 36415 COLL VENOUS BLD VENIPUNCTURE: CPT

## 2025-01-23 PROCEDURE — 86803 HEPATITIS C AB TEST: CPT

## 2025-01-23 PROCEDURE — 87389 HIV-1 AG W/HIV-1&-2 AB AG IA: CPT

## 2025-01-28 ENCOUNTER — APPOINTMENT (OUTPATIENT)
Dept: URGENT CARE | Age: 60
End: 2025-01-28
Payer: OTHER MISCELLANEOUS

## 2025-01-28 PROCEDURE — 99213 OFFICE O/P EST LOW 20 MIN: CPT
